# Patient Record
Sex: MALE | Race: OTHER | HISPANIC OR LATINO
[De-identification: names, ages, dates, MRNs, and addresses within clinical notes are randomized per-mention and may not be internally consistent; named-entity substitution may affect disease eponyms.]

---

## 2021-10-23 ENCOUNTER — APPOINTMENT (OUTPATIENT)
Dept: DISASTER EMERGENCY | Facility: CLINIC | Age: 56
End: 2021-10-23

## 2021-10-23 DIAGNOSIS — Z01.818 ENCOUNTER FOR OTHER PREPROCEDURAL EXAMINATION: ICD-10-CM

## 2021-10-23 PROBLEM — Z00.00 ENCOUNTER FOR PREVENTIVE HEALTH EXAMINATION: Status: ACTIVE | Noted: 2021-10-23

## 2022-11-05 ENCOUNTER — EMERGENCY (EMERGENCY)
Facility: HOSPITAL | Age: 57
LOS: 1 days | Discharge: HOME | End: 2022-11-05
Attending: EMERGENCY MEDICINE
Payer: MEDICAID

## 2022-11-05 VITALS
HEART RATE: 69 BPM | RESPIRATION RATE: 18 BRPM | SYSTOLIC BLOOD PRESSURE: 120 MMHG | TEMPERATURE: 98 F | OXYGEN SATURATION: 100 % | DIASTOLIC BLOOD PRESSURE: 77 MMHG

## 2022-11-05 VITALS
SYSTOLIC BLOOD PRESSURE: 116 MMHG | OXYGEN SATURATION: 98 % | RESPIRATION RATE: 20 BRPM | WEIGHT: 160.06 LBS | TEMPERATURE: 99 F | DIASTOLIC BLOOD PRESSURE: 73 MMHG | HEART RATE: 69 BPM

## 2022-11-05 LAB
RAPID RVP RESULT: SIGNIFICANT CHANGE UP
SARS-COV-2 RNA SPEC QL NAA+PROBE: SIGNIFICANT CHANGE UP

## 2022-11-05 PROCEDURE — 71045 X-RAY EXAM CHEST 1 VIEW: CPT

## 2022-11-05 PROCEDURE — 71045 X-RAY EXAM CHEST 1 VIEW: CPT | Mod: 26

## 2022-11-05 PROCEDURE — 99284 EMERGENCY DEPT VISIT MOD MDM: CPT | Mod: 25

## 2022-11-05 PROCEDURE — 0225U NFCT DS DNA&RNA 21 SARSCOV2: CPT

## 2022-11-05 PROCEDURE — 99284 EMERGENCY DEPT VISIT MOD MDM: CPT

## 2022-11-05 RX ORDER — ACETAMINOPHEN 500 MG
650 TABLET ORAL EVERY 6 HOURS
Refills: 0 | Status: DISCONTINUED | OUTPATIENT
Start: 2022-11-05 | End: 2022-11-09

## 2022-11-05 RX ADMIN — Medication 650 MILLIGRAM(S): at 17:49

## 2022-11-05 NOTE — ED PROVIDER NOTE - CLINICAL SUMMARY MEDICAL DECISION MAKING FREE TEXT BOX
most likely Covid or other viral illness ,looks good ,will obtain RVP with Covid ,Tylenol chest xray reassess ZR

## 2022-11-05 NOTE — ED PROVIDER NOTE - PATIENT PORTAL LINK FT
You can access the FollowMyHealth Patient Portal offered by [facility] by registering at the following website: [email or web address]. By joining [facility]’s FollowMyHealth portal, you will also be able to view your health information using other applications (apps) compatible with our system.

## 2022-11-05 NOTE — ED PROVIDER NOTE - NSFOLLOWUPINSTRUCTIONS_ED_ALL_ED_FT
Your Covid test is still pending, please call the hospital to follow-up the results.     Please follow-up with your primary care doctor.    Take Tylenol and/or Ibuprofen every 4-6 hours as needed for fever/body aches.    Stay hydrated.    ***Return to the ED if you have any new or worsening symptoms such as Chest pain, difficulty breathing, or any other concerning symptoms***

## 2022-11-05 NOTE — ED ADULT NURSE NOTE - NSIMPLEMENTINTERV_GEN_ALL_ED
Implemented All Universal Safety Interventions:  [place] to call system. Call bell, personal items and telephone within reach. Instruct patient to call for assistance. Room bathroom lighting operational. Non-slip footwear when patient is off stretcher. Physically safe environment: no spills, clutter or unnecessary equipment. Stretcher in lowest position, wheels locked, appropriate side rails in place.

## 2022-11-05 NOTE — ED ADULT NURSE NOTE - OBJECTIVE STATEMENT
57y M c/o productive cough, Jin, Fatigue for 2 week after being exposed to uncle who lives with family tested positive for covid yesterday. Pt states when he lays flat, cough gets worse. Denies N/V/D/SOB/CP/Gi/Gu symptoms. No PSH. PMH of BPH, HTN, HLD, GERD. VSS

## 2022-11-05 NOTE — ED PROVIDER NOTE - OBJECTIVE STATEMENT
57 y old male  ,positive exposure to Covid ,lives with brother who tested positive,  came in with fever ,chills body aches ,couth , sore throat  no significant past medical issues

## 2023-04-02 ENCOUNTER — EMERGENCY (EMERGENCY)
Facility: HOSPITAL | Age: 58
LOS: 1 days | Discharge: HOME | End: 2023-04-02
Attending: EMERGENCY MEDICINE
Payer: SELF-PAY

## 2023-04-02 VITALS
OXYGEN SATURATION: 98 % | SYSTOLIC BLOOD PRESSURE: 135 MMHG | HEIGHT: 68 IN | HEART RATE: 66 BPM | WEIGHT: 179.9 LBS | TEMPERATURE: 98 F | DIASTOLIC BLOOD PRESSURE: 84 MMHG | RESPIRATION RATE: 16 BRPM

## 2023-04-02 VITALS
TEMPERATURE: 98 F | HEART RATE: 64 BPM | SYSTOLIC BLOOD PRESSURE: 136 MMHG | DIASTOLIC BLOOD PRESSURE: 83 MMHG | RESPIRATION RATE: 16 BRPM | OXYGEN SATURATION: 98 %

## 2023-04-02 PROCEDURE — 99284 EMERGENCY DEPT VISIT MOD MDM: CPT | Mod: 25

## 2023-04-02 PROCEDURE — 65220 REMOVE FOREIGN BODY FROM EYE: CPT | Mod: LT

## 2023-04-02 PROCEDURE — 90471 IMMUNIZATION ADMIN: CPT

## 2023-04-02 PROCEDURE — 90715 TDAP VACCINE 7 YRS/> IM: CPT

## 2023-04-02 RX ORDER — TETANUS TOXOID, REDUCED DIPHTHERIA TOXOID AND ACELLULAR PERTUSSIS VACCINE, ADSORBED 5; 2.5; 8; 8; 2.5 [IU]/.5ML; [IU]/.5ML; UG/.5ML; UG/.5ML; UG/.5ML
0.5 SUSPENSION INTRAMUSCULAR ONCE
Refills: 0 | Status: COMPLETED | OUTPATIENT
Start: 2023-04-02 | End: 2023-04-02

## 2023-04-02 RX ORDER — OFLOXACIN 0.3 %
2 DROPS OPHTHALMIC (EYE) ONCE
Refills: 0 | Status: COMPLETED | OUTPATIENT
Start: 2023-04-02 | End: 2023-04-02

## 2023-04-02 RX ADMIN — Medication 2 DROP(S): at 19:41

## 2023-04-02 RX ADMIN — TETANUS TOXOID, REDUCED DIPHTHERIA TOXOID AND ACELLULAR PERTUSSIS VACCINE, ADSORBED 0.5 MILLILITER(S): 5; 2.5; 8; 8; 2.5 SUSPENSION INTRAMUSCULAR at 20:37

## 2023-04-02 NOTE — ED ADULT TRIAGE NOTE - CHIEF COMPLAINT QUOTE
eye injury, got something in his eye while cutting wood Friday, blurry vision since Friday, eye redness

## 2023-04-02 NOTE — ED PROVIDER NOTE - PHYSICAL EXAMINATION
GENERAL: Awake, alert, NAD  HEENT: EOMI 20/40 right eye 20/60 left eye foreign body visualized 6 to cornea   ABDOMEN: Soft, , non tender, non distended, no rebound, no guarding  EXT: No edema, no calf tenderness,  no deformities.  NEURO: A&Ox3. Moving all extremities.  SKIN: Warm and dry. No rash.  PSYCH: Normal affect.

## 2023-04-02 NOTE — ED PROVIDER NOTE - PATIENT PORTAL LINK FT
You can access the FollowMyHealth Patient Portal offered by [facility] by registering at the following website: [email or web address]. By joining [facility]’s FollowMyHealth portal, you will also be able to view your health information using other applications (apps) compatible with our system.

## 2023-04-02 NOTE — ED PROVIDER NOTE - OBJECTIVE STATEMENT
57-year-old without any significant past medical history chief complaint of eye pain to his left eye.  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  Patient also endorsing pain with eye movement.  int id [id number]

## 2023-04-02 NOTE — ED ADULT NURSE NOTE - OBJECTIVE STATEMENT
58 y/o male with PMH of HLD, GERD, BPH, and HTN presents to ED c/o eye pain. Pt seen in blue fast track area of ER. Pt states they got dust in their L eye on Friday. Pt endorsing mireya eblurry vision of L eye and 4/10 L eye pain. Pt presents with red L eye. Of note, pt vision of R eye is "bad I can't usually see out of that eye". Pt is A&Ox4, follows commands, breathing spontaneous and unlabored. 58 y/o male with PMH of HLD, GERD, BPH, and HTN presents to ED c/o eye pain. Pt seen in blue fast track area of ER. Pt states they got dust in their L eye on Friday at work. Pt endorsing some blurry vision of L eye and 4/10 L eye pain. Pt presents with red L eye, no swelling noted. Of note, pt vision of R eye is "bad I can't usually see out of that eye, my good eye is my L". Pt is A&Ox4, follows commands, breathing spontaneous and unlabored, PERRL.

## 2023-04-02 NOTE — ED PROVIDER NOTE - NSFOLLOWUPCLINICS_GEN_ALL_ED_FT
[facility] - Ophthalmology  Ophthalmology  [address], [address]  [place] [address]  Phone: [phone]  Fax:   Follow Up Time: Urgent

## 2023-04-02 NOTE — ED PROVIDER NOTE - NSFOLLOWUPINSTRUCTIONS_ED_ALL_ED_FT
Cuerpo extraño en el dre  Eye Foreign Body  Al hablar de cuerpo extraño en el dre, se hace referencia a un objeto extraño que se encuentra en la superficie del dre o en el globo ocular y que no debería estar allí. El cuerpo extraño puede ser ricardo pequeña partícula de suciedad o polvo, un pelo, ricardo pestaña, ricardo astilla, un trozo diminuto de metal o cualquier otro objeto.    Un cuerpo extraño puede lesionar el dre. Se puede encontrar fuera del globo ocular (extraocular) o dentro del globo ocular (intraocular). Un cuerpo extraño intraocular constituye ricardo emergencia médica porque puede ocasionar pérdida de la visión o pérdida del dre.    ¿Cuáles son las causas?  La causa de esta afección es que un objeto ingrese accidentalmente en el dre o lo golpee. Ricardo causa común es cuando ricardo pieza diminuta de metal vuela por el aire cuando la persona está trabajando con ciertas herramientas.    ¿Cuáles son los signos o síntomas?  Los síntomas de esta afección dependen de cuál sea el cuerpo extraño y en qué parte del dre se encuentre. En algunos casos, un cuerpo extraño pequeño puede causar pocos síntomas al principio. Lugares donde habitualmente se encuentran los cuerpos extraños:  En la superficie interna de los párpados o en la cubierta de la parte jimmy del dre (conjuntiva). Un cuerpo extraño en quentin lugar puede causar lo siguiente:  Dolor e irritación, en especial al parpadear.  Sensación de tener algo en el dre.  Lagrimeo.  Enrojecimiento.  En la superficie de la cubierta transparente que está en la parte anterior del dre (córnea). Un cuerpo extraño en quentin lugar puede causar lo siguiente:  Dolor e irritación.  Pequeños “anillos de óxido” alrededor de un cuerpo extraño de metal (metálico).  Sensación de tener algo en el dre.  Visión borrosa.  Lagrimeo.  Enrojecimiento.  Dentro del globo ocular. Un cuerpo extraño en quentin lugar puede causar lo siguiente:  Mucho dolor.  Pérdida de la visión o visión borrosa inmediatas.  Un cambio en la forma de la parte dayami del dre (distorsión de la pupila).  ¿Cómo se diagnostica?  Los cuerpos extraños se encuentran radha un examen realizado por un oculista.  Los cuerpos extraños que están en los párpados, la conjuntiva o la córnea, por lo general (gisselle no siempre), son fáciles de encontrar.  Cuando un cuerpo extraño está dentro del globo ocular, puede formarse ricardo opacidad en el cristalino del dre (catarata) walter de inmediato. [place] le dificulta al oculista encontrar el cuerpo extraño. Es posible que tenga que hacerse estudios, por ejemplo:  Ricardo ecografía.  Radiografías.  Exploración por tomografía computarizada (TC).  ¿Cómo se trata?  Los cuerpos extraños en los párpados, la conjuntiva o la córnea se suelen quitar con facilidad y sin dolor. El médico puede hacerlo lavando el dre o usando instrumentos pequeños para extraer el cuerpo extraño. El tratamiento también puede incluir lo siguiente:  Administración de gotas oftálmicas que disminuyen la sensibilidad (anestésicas) para aliviar el dolor.  Extracción de óxido de la córnea con un pequeño instrumento similar a un taladro.  Antibióticos en forma de gotas o ungüento si el cuerpo extraño provocó un rasguño o ricardo raspadura (abrasión) en la córnea.  Uso de ricardo venda (parche ocular) sobre el dre.  Si tiene un cuerpo extraño dentro del globo ocular, necesitará cirugía de inmediato.    Es posible que lo deriven a un oculista (oftalmólogo) para recibir otros tratamientos.    Siga estas instrucciones en parikh casa:    Medicamentos    Use los medicamentos de venta radha y los recetados solamente sanjana se lo haya indicado el médico. Use las gotas oftálmicas o el ungüento sanjana se le hayan indicado.  Si le recetaron antibiótico en forma de gotas o ungüento, úselo según las indicaciones del médico. No deje de usar el antibiótico aunque la afección mejore.  Instrucciones generales    Si tiene un parche:  Úselo sanjana se le hayan indicado. Siga las instrucciones del médico acerca de cuándo retirar el parche.  No conduzca ni use maquinaria mientras tenga el parche puesto.  Si no tiene un parche:  Mantenga el dre cerrado la mayor cantidad de tiempo posible.  No se frote el dre.  No use lentes de contacto hasta que vuelva a sentir el dre con normalidad o según se lo haya indicado el médico.  Use anteojos de sol oscuros, cuando sea necesario, para proteger los ojos chan brillante.  Si hace actividades que lo exponen a un alto riesgo de sufrir lesiones oculares, sanjana trabajos con herramientas de alaina velocidad, use un protector ocular.  Cumpla con todas las visitas de seguimiento. [place] es importante.  Comuníquese con un médico si:  Siente más dolor en el dre.  Tiene problemas con el parche.  Le sale un líquido (secreción) del dre que no es normal.  Solicite ayuda de inmediato si:  La visión empeora.  Tiene más enrojecimiento e hinchazón alrededor del dre.  Resumen  Al hablar de cuerpo extraño en el dre, se hace referencia a un objeto extraño que se encuentra en la superficie del dre o en el globo ocular y que no debería estar allí.  Un cuerpo extraño puede lesionar el dre. Se puede encontrar fuera del globo ocular (extraocular) o dentro del globo ocular (intraocular). Un cuerpo extraño intraocular constituye ricardo emergencia médica.  La causa de esta afección es que un objeto tenga contacto o ingrese accidentalmente en el dre. Estos objetos pueden ser partículas de polvo, suciedad, lambert o metal, o ricardo pestaña.  El tratamiento puede implicar la eliminación del cuerpo extraño mediante el lavado del dre, el uso de ciertos instrumentos o ricardo cirugía. Es posible que necesite usar antibióticos o usar un vendaje (parche ocular) sobre el dre.  Esta información no tiene sanjana fin reemplazar el consejo del médico. Asegúrese de hacerle al médico cualquier pregunta que tenga. Please follow-up with the eye doctor tomorrow.  They are aware that he there and/are expecting you.      Cuerpo extraño en el dre  Eye Foreign Body  Al hablar de cuerpo extraño en el dre, se hace referencia a un objeto extraño que se encuentra en la superficie del dre o en el globo ocular y que no debería estar allí. El cuerpo extraño puede ser ricardo pequeña partícula de suciedad o polvo, un pelo, ricardo pestaña, ricardo astilla, un trozo diminuto de metal o cualquier otro objeto.    Un cuerpo extraño puede lesionar el dre. Se puede encontrar fuera del globo ocular (extraocular) o dentro del globo ocular (intraocular). Un cuerpo extraño intraocular constituye ricardo emergencia médica porque puede ocasionar pérdida de la visión o pérdida del dre.    ¿Cuáles son las causas?  La causa de esta afección es que un objeto ingrese accidentalmente en el dre o lo golpee. Ricardo causa común es cuando ricardo pieza diminuta de metal vuela por el aire cuando la persona está trabajando con ciertas herramientas.    ¿Cuáles son los signos o síntomas?  Los síntomas de esta afección dependen de cuál sea el cuerpo extraño y en qué parte del dre se encuentre. En algunos casos, un cuerpo extraño pequeño puede causar pocos síntomas al principio. Lugares donde habitualmente se encuentran los cuerpos extraños:  En la superficie interna de los párpados o en la cubierta de la parte jimmy del dre (conjuntiva). Un cuerpo extraño en quentin lugar puede causar lo siguiente:  Dolor e irritación, en especial al parpadear.  Sensación de tener algo en el dre.  Lagrimeo.  Enrojecimiento.  En la superficie de la cubierta transparente que está en la parte anterior del dre (córnea). Un cuerpo extraño en quentin lugar puede causar lo siguiente:  Dolor e irritación.  Pequeños “anillos de óxido” alrededor de un cuerpo extraño de metal (metálico).  Sensación de tener algo en el dre.  Visión borrosa.  Lagrimeo.  Enrojecimiento.  Dentro del globo ocular. Un cuerpo extraño en quentin lugar puede causar lo siguiente:  Mucho dolor.  Pérdida de la visión o visión borrosa inmediatas.  Un cambio en la forma de la parte dayami del dre (distorsión de la pupila).  ¿Cómo se diagnostica?  Los cuerpos extraños se encuentran radha un examen realizado por un oculista.  Los cuerpos extraños que están en los párpados, la conjuntiva o la córnea, por lo general (gisselle no siempre), son fáciles de encontrar.  Cuando un cuerpo extraño está dentro del globo ocular, puede formarse ricardo opacidad en el cristalino del dre (catarata) walter de inmediato. [place] le dificulta al oculista encontrar el cuerpo extraño. Es posible que tenga que hacerse estudios, por ejemplo:  Ricardo ecografía.  Radiografías.  Exploración por tomografía computarizada (TC).  ¿Cómo se trata?  Los cuerpos extraños en los párpados, la conjuntiva o la córnea se suelen quitar con facilidad y sin dolor. El médico puede hacerlo lavando el dre o usando instrumentos pequeños para extraer el cuerpo extraño. El tratamiento también puede incluir lo siguiente:  Administración de gotas oftálmicas que disminuyen la sensibilidad (anestésicas) para aliviar el dolor.  Extracción de óxido de la córnea con un pequeño instrumento similar a un taladro.  Antibióticos en forma de gotas o ungüento si el cuerpo extraño provocó un rasguño o ricardo raspadura (abrasión) en la córnea.  Uso de ricardo venda (parche ocular) sobre el dre.  Si tiene un cuerpo extraño dentro del globo ocular, necesitará cirugía de inmediato.    Es posible que lo deriven a un oculista (oftalmólogo) para recibir otros tratamientos.    Siga estas instrucciones en parikh casa:    Medicamentos    Use los medicamentos de venta radha y los recetados solamente sanjana se lo haya indicado el médico. Use las gotas oftálmicas o el ungüento sanjana se le hayan indicado.  Si le recetaron antibiótico en forma de gotas o ungüento, úselo según las indicaciones del médico. No deje de usar el antibiótico aunque la afección mejore.  Instrucciones generales    Si tiene un parche:  Úselo sanjana se le hayan indicado. Siga las instrucciones del médico acerca de cuándo retirar el parche.  No conduzca ni use maquinaria mientras tenga el parche puesto.  Si no tiene un parche:  Mantenga el dre cerrado la mayor cantidad de tiempo posible.  No se frote el dre.  No use lentes de contacto hasta que vuelva a sentir el dre con normalidad o según se lo haya indicado el médico.  Use anteojos de sol oscuros, cuando sea necesario, para proteger los ojos chan brillante.  Si hace actividades que lo exponen a un alto riesgo de sufrir lesiones oculares, sanjana trabajos con herramientas de alaina velocidad, use un protector ocular.  Cumpla con todas las visitas de seguimiento. [place] es importante.  Comuníquese con un médico si:  Siente más dolor en el dre.  Tiene problemas con el parche.  Le sale un líquido (secreción) del dre que no es normal.  Solicite ayuda de inmediato si:  La visión empeora.  Tiene más enrojecimiento e hinchazón alrededor del dre.  Resumen  Al hablar de cuerpo extraño en el dre, se hace referencia a un objeto extraño que se encuentra en la superficie del dre o en el globo ocular y que no debería estar allí.  Un cuerpo extraño puede lesionar el dre. Se puede encontrar fuera del globo ocular (extraocular) o dentro del globo ocular (intraocular). Un cuerpo extraño intraocular constituye ricardo emergencia médica.  La causa de esta afección es que un objeto tenga contacto o ingrese accidentalmente en el dre. Estos objetos pueden ser partículas de polvo, suciedad, lambert o metal, o ricardo pestaña.  El tratamiento puede implicar la eliminación del cuerpo extraño mediante el lavado del dre, el uso de ciertos instrumentos o ricardo cirugía. Es posible que necesite usar antibióticos o usar un vendaje (parche ocular) sobre el dre.  Esta información no tiene sanjana fin reemplazar el consejo del médico. Asegúrese de hacerle al médico cualquier pregunta que tenga.

## 2023-04-02 NOTE — ED PROVIDER NOTE - CLINICAL SUMMARY MEDICAL DECISION MAKING FREE TEXT BOX
57 -year-old without any significant past medical conditions chief complaint of foreign eye to the left eye.  Given history and physical will attempt removal and given ofloxacin eye drops w/ rapid optho follow up 57 -year-old without any significant past medical conditions chief complaint of foreign body to the left eye.  Given history and physical will attempt removal and given ofloxacin eye drops w/ rapid optho follow up    GABRIEL Branch MD: 57M without sig pmh p/w FB L eye - small sub-millimeter piece of medal to L eye from work x few days. No changes to vision. No concern for globe injury. FB partially removed. Plan for abx drops and outpt Optho f/u tomorrow with return precautions

## 2023-04-02 NOTE — ED PROVIDER NOTE - PROGRESS NOTE DETAILS
Tristen PGY 2 Spoke to ophthalmology will send over patient information ophthalmology said they will specifically For patient tomorrow at clinic. Tristen PGY 2 Information sent over to [facility] residents that he will look out for in clinic tomorrow./Call patient provided phone and provider as well.

## 2023-04-03 ENCOUNTER — APPOINTMENT (OUTPATIENT)
Dept: OPHTHALMOLOGY | Facility: CLINIC | Age: 58
End: 2023-04-03
Payer: MEDICAID

## 2023-04-03 ENCOUNTER — NON-APPOINTMENT (OUTPATIENT)
Age: 58
End: 2023-04-03

## 2023-04-03 PROBLEM — I10 ESSENTIAL (PRIMARY) HYPERTENSION: Chronic | Status: ACTIVE | Noted: 2022-11-05

## 2023-04-03 PROBLEM — N40.0 BENIGN PROSTATIC HYPERPLASIA WITHOUT LOWER URINARY TRACT SYMPTOMS: Chronic | Status: ACTIVE | Noted: 2022-11-05

## 2023-04-03 PROBLEM — E78.5 HYPERLIPIDEMIA, UNSPECIFIED: Chronic | Status: ACTIVE | Noted: 2022-11-05

## 2023-04-03 PROBLEM — K21.9 GASTRO-ESOPHAGEAL REFLUX DISEASE WITHOUT ESOPHAGITIS: Chronic | Status: ACTIVE | Noted: 2022-11-05

## 2023-04-03 PROCEDURE — 65222 REMOVE FOREIGN BODY FROM EYE: CPT | Mod: LT

## 2023-04-03 PROCEDURE — 92002 INTRM OPH EXAM NEW PATIENT: CPT | Mod: 25

## 2023-04-07 ENCOUNTER — APPOINTMENT (OUTPATIENT)
Dept: OPHTHALMOLOGY | Facility: CLINIC | Age: 58
End: 2023-04-07
Payer: MEDICAID

## 2023-04-07 ENCOUNTER — NON-APPOINTMENT (OUTPATIENT)
Age: 58
End: 2023-04-07

## 2023-04-07 PROCEDURE — 92025 CPTRIZED CORNEAL TOPOGRAPHY: CPT

## 2023-04-07 PROCEDURE — 92012 INTRM OPH EXAM EST PATIENT: CPT

## 2023-04-12 ENCOUNTER — NON-APPOINTMENT (OUTPATIENT)
Age: 58
End: 2023-04-12

## 2023-04-12 ENCOUNTER — APPOINTMENT (OUTPATIENT)
Dept: OPHTHALMOLOGY | Facility: CLINIC | Age: 58
End: 2023-04-12
Payer: MEDICAID

## 2023-04-12 PROCEDURE — 92012 INTRM OPH EXAM EST PATIENT: CPT

## 2023-04-20 ENCOUNTER — APPOINTMENT (OUTPATIENT)
Dept: OPHTHALMOLOGY | Facility: CLINIC | Age: 58
End: 2023-04-20
Payer: MEDICAID

## 2023-04-20 ENCOUNTER — NON-APPOINTMENT (OUTPATIENT)
Age: 58
End: 2023-04-20

## 2023-04-20 PROCEDURE — 92012 INTRM OPH EXAM EST PATIENT: CPT

## 2023-04-28 ENCOUNTER — NON-APPOINTMENT (OUTPATIENT)
Age: 58
End: 2023-04-28

## 2023-04-28 ENCOUNTER — APPOINTMENT (OUTPATIENT)
Dept: OPHTHALMOLOGY | Facility: CLINIC | Age: 58
End: 2023-04-28
Payer: MEDICAID

## 2023-04-28 PROCEDURE — 92012 INTRM OPH EXAM EST PATIENT: CPT

## 2023-05-24 ENCOUNTER — APPOINTMENT (OUTPATIENT)
Dept: OPHTHALMOLOGY | Facility: CLINIC | Age: 58
End: 2023-05-24
Payer: MEDICAID

## 2023-05-24 ENCOUNTER — NON-APPOINTMENT (OUTPATIENT)
Age: 58
End: 2023-05-24

## 2023-05-24 PROCEDURE — 92012 INTRM OPH EXAM EST PATIENT: CPT

## 2023-08-07 ENCOUNTER — APPOINTMENT (OUTPATIENT)
Dept: OPHTHALMOLOGY | Facility: CLINIC | Age: 58
End: 2023-08-07
Payer: MEDICAID

## 2023-08-07 ENCOUNTER — NON-APPOINTMENT (OUTPATIENT)
Age: 58
End: 2023-08-07

## 2023-08-07 PROCEDURE — 92012 INTRM OPH EXAM EST PATIENT: CPT

## 2024-02-05 ENCOUNTER — APPOINTMENT (OUTPATIENT)
Dept: OPHTHALMOLOGY | Facility: CLINIC | Age: 59
End: 2024-02-05

## 2024-03-11 NOTE — ED ADULT NURSE NOTE - NS ED NOTE ABUSE SUSPICION NEGLECT YN
-- DO NOT REPLY / DO NOT REPLY ALL --  -- Message is from [facility] [employer] ([facility]) --    ONLY TO BE USED WITHIN A REFILL MEDICATION ENCOUNTER    Med Refill  Is the patient currently having any symptoms?: No/Non-Emergent symptoms    Name of medication requested: See pended med    Is this the first request for the medication in the last 48 hours?: Yes    Patient is requesting a medication refill - medication is on active medication list    Patient is currently OUT of the requested medication - sent as HIGH priority    Full name of the provider who ordered the medication: Edilma Connors NP     Clinic site name / Account # for provider: [facility]     Preferred Pharmacy: Pharmacy  [id number] [place], [place] - [address] [place] [address]    Patient confirmed the above pharmacy as correct?  Yes    Caller Information         Type Contact Phone/Fax    03/11/2024 11:19 AM CDT Phone (Incoming) Emmett Dillon (Self) [phone] (M)            Alternative phone number: NA    Can a detailed message be left?: Yes         No